# Patient Record
Sex: FEMALE | Race: OTHER | HISPANIC OR LATINO | URBAN - METROPOLITAN AREA
[De-identification: names, ages, dates, MRNs, and addresses within clinical notes are randomized per-mention and may not be internally consistent; named-entity substitution may affect disease eponyms.]

---

## 2022-05-03 ENCOUNTER — ANNUAL EXAM (OUTPATIENT)
Dept: OBGYN CLINIC | Facility: MEDICAL CENTER | Age: 32
End: 2022-05-03
Payer: COMMERCIAL

## 2022-05-03 VITALS — SYSTOLIC BLOOD PRESSURE: 108 MMHG | WEIGHT: 150.9 LBS | DIASTOLIC BLOOD PRESSURE: 68 MMHG

## 2022-05-03 DIAGNOSIS — N89.8 VAGINAL ODOR: ICD-10-CM

## 2022-05-03 DIAGNOSIS — T83.32XA INTRAUTERINE CONTRACEPTIVE DEVICE THREADS LOST, INITIAL ENCOUNTER: ICD-10-CM

## 2022-05-03 DIAGNOSIS — Z01.419 ENCOUNTER FOR ROUTINE GYNECOLOGICAL EXAMINATION WITH PAPANICOLAOU SMEAR OF CERVIX: Primary | ICD-10-CM

## 2022-05-03 PROCEDURE — G0145 SCR C/V CYTO,THINLAYER,RESCR: HCPCS | Performed by: OBSTETRICS & GYNECOLOGY

## 2022-05-03 PROCEDURE — 99385 PREV VISIT NEW AGE 18-39: CPT | Performed by: OBSTETRICS & GYNECOLOGY

## 2022-05-03 PROCEDURE — 0503F POSTPARTUM CARE VISIT: CPT | Performed by: OBSTETRICS & GYNECOLOGY

## 2022-05-03 PROCEDURE — G0476 HPV COMBO ASSAY CA SCREEN: HCPCS | Performed by: OBSTETRICS & GYNECOLOGY

## 2022-05-03 NOTE — PATIENT INSTRUCTIONS
Frotis de Papanicolaou   LO QUE NECESITA SABER:   Un frotis o de Papanicolaou o eduardo citología se [place] en la detección del cáncer cervical  También se [place] para detectar células cancerosas y precancerosas en la vulva y en la vagina  INSTRUCCIONES SOBRE EL LEVI HOSPITALARIA:   Prepárese para un Papanicolaou: El mejor momento para programar el examen es kierra después de que termine harrington menstruación  No programe eduardo citología jenni harrington periodo menstrual   Jenni la prueba de Papanicolaou:  · Usted se acostará [place] y colocará [place] pies en unos reposapiés que se conocen maria fernanda estribos  Harrington médico introducirá cuidadosamente en harrington vagina un dispositivo que se conoce maria fernanda espéculo  El espéculo se [place] para abrir las sofia de la vagina para que el médico pueda observar harrington margaret uterino  · Harrington médico tomará muestras de células de harrington margaret uterino y vagina con cuidado  Las muestras se colocan en un tubo con líquido o en un portaobjetos de velia  Las muestras se mandan al laboratorio para ser analizadas por la presencia de células anormales  Podrían realizarle eduardo prueba para detectar el virus del papiloma humano (VPH) al [employer]  El VPH es un virus de transmisión sexual que puede provocar cambios en whit[place] células cervicales  Después de la prueba de Papanicolaou: Es posible que presente manchado el día del procedimiento  Resultados de la prueba: Harrington médico le indicará para cuándo puede esperar [place] resultados de la prueba del Papanicolaou  Suelen tardar alrededor de 1 a 3 semanas  · Los resultados normales significan que no se encontraron cambios celulares en el margaret del [place]  Es posible que pueda esperar de 3 a 5 años para harrington próxima prueba de Papanicolaou  · Los [employer] shaina pueden significar que no obtuvieron [place] de tejido del margaret del [place]  O gardenia, puede significar que hay cambios celulares que parecen anormales   [place] podría ser debido a eduardo infección, a un embarazo, a la menopausia o al VPH  Es posible que necesite otra prueba de Papanicolaou en 1 año  Harrington médico le indicará qué hacer a continuación  · Los resultados anormales significan que se encontraron cambios celulares en el margaret del [place]  [place] no significa que tenga cáncer de margaret uterino  Podría significar que tiene inflamación o eduardo infección  También podría significar que tiene VPH o células que podrían convertirse en cáncer  Harrington médico le explicará el resultado anormal de la prueba y repasará los siguientes pasos con usted  Es posible que le recomiende eduardo colposcopia  González cory procedimiento, se utiliza un pequeño endoscopio con Tanner Maser rani para observar más de cerca el margaret uterino y la vagina  Frecuencia de realización del Papanicolaou: Las pruebas de Papanicolaou generalmente comienzan a los 24 años y [address]  Se puede hacer eduardo prueba de Papanicolaou yolie cada 3 años  La prueba del VPH yolie o con la prueba de Papanicolaou se puede hacer cada 5 años, comenzando a los 27 años de Samuel  Usted podría necesitar un frotis de Papanicolaou con mayor frecuencia después de los 72 años si presenta cualquiera de los siguientes:  · Resultado anormal de la prueba de Papanicolaou    · Resultado positivo en la prueba de VPH    · Antecedentes de cáncer de margaret uterino o un alto riesgo de cáncer de margaret uterino    · El VIH    · Un sistema inmunitario débil    · Exposición al [facility]  [employer] de dietilestilbestrol (PAT) cuando harrington madre estaba embarazada de usted    Llame a harrington médico si:  · Tiene eduardo hemorragia grave  · Trujillo pasado 3 semanas y no tiene los resultados de la prueba  · Usted tiene preguntas o inquietudes acerca de harrington condición o cuidado  © Copyright [employer] 2022 Information is for End User's use only and may not be sold, redistributed or otherwise used for commercial purposes   All illustrations and images included in CareNotes® are the copyrighted property of [employer]  or [address]  Esta información es sólo para uso en educación  Harrington intención no es darle un consejo médico sobre enfermedades o tratamientos  Colsulte con harrington Lucia Kash farmacéutico antes de seguir cualquier régimen médico para saber si es seguro y efectivo para usted

## 2022-05-03 NOTE — PROGRESS NOTES
ASSESSMENT & PLAN: Tomy Soriano is a 34 y o  No obstetric history on file  with normal gynecologic exam     1   Routine well woman exam done today  2  Pap and HPV:  The patient's last pap and hpv was unknown  It was normal     Pap and cotesting was done today  Current ASCCP Guidelines reviewed  3   The following were reviewed in today's visit: breast self exam, family planning choices, exercise and healthy diet  4  paragard since 5 years ago - strings not seen on exam today / US ordered     CC:  Annual Gynecologic Examination    HPI: Tomy Soriano is a 34 y o  No obstetric history on file  who presents for annual gynecologic examination  She has the following concerns:  contemplating pregnancy in the future / 2 prior C/S / paragard for birth control      Health Maintenance:    She wears her seatbelt routinely  She does perform regular monthly self breast exams  She feels safe at home  No past medical history on file  No past surgical history on file  Past OB/Gyn History:  OB History    No obstetric history on file  Pt does not have menstrual issues  History of sexually transmitted infection: No   History of abnormal pap smears: No      Patient is currently sexually active  heterosexual   The current method of family planning is IUD  No family history on file      Social History:  Social History     Socioeconomic History    Marital status: /Civil Union     Spouse name: Not on file    Number of children: Not on file    Years of education: Not on file    Highest education level: Not on file   Occupational History    Not on file   Tobacco Use    Smoking status: Never Smoker    Smokeless tobacco: Never Used   Substance and Sexual Activity    Alcohol use: Never    Drug use: Never    Sexual activity: Yes   Other Topics Concern    Not on file   Social History Narrative    Not on file     Social Determinants of Health     Financial Resource Strain: Not on file Food Insecurity: Not on file   Transportation Needs: Not on file   Physical Activity: Not on file   Stress: Not on file   Social Connections: Not on file   Intimate Partner Violence: Not on file   Housing Stability: Not on file         Allergies   Allergen Reactions    Aspirin Swelling and Rash       No current outpatient medications on file  Review of Systems  Constitutional :no fever, feels well, no tiredness, no recent weight gain or loss  ENT: no ear ache, no loss of hearing, no nosebleeds or nasal discharge, no sore throat or hoarseness  Cardiovascular: no complaints of slow or fast heart beat, no chest pain, no palpitations, no leg claudication or lower extremity edema  Respiratory: no complaints of shortness of shortness of breath, no SHELLEY  Breasts:no complaints of breast pain, breast lump, or nipple discharge  Gastrointestinal: no complaints of abdominal pain, constipation, nausea, vomiting, or diarrhea or bloody stools  Genitourinary : no complaints of dysuria, incontinence, pelvic pain, no dysmenorrhea, vaginal discharge or abnormal vaginal bleeding and as noted in HPI  Musculoskeletal: no complaints of arthralgia, no myalgia, no joint swelling or stiffness, no limb pain or swelling  Integumentary: no complaints of skin rash or lesion, itching or dry skin  Neurological: no complaints of headache, no confusion, no numbness or tingling, no dizziness or fainting    Objective      /68   Wt 68 4 kg (150 lb 14 4 oz)   LMP 04/09/2022   General:   appears stated age, cooperative, alert normal mood and affect   Lungs: Unlabored breathing    Breasts: normal appearance, no masses or tenderness   Abdomen: soft, non-tender, without masses or organomegaly   Vulva: normal   Vagina: normal vagina, no discharge, exudate, lesion, or erythema   Urethra: normal   Cervix: Normal, no discharge   IUS strings not seen    Uterus: normal size, contour, position, consistency, mobility, non-tender   Adnexa: no mass, fullness, tenderness   Psychiatric orientation to person, place, and time: normal  mood and affect: normal

## 2022-05-04 LAB
HPV HR 12 DNA CVX QL NAA+PROBE: NEGATIVE
HPV16 DNA CVX QL NAA+PROBE: NEGATIVE
HPV18 DNA CVX QL NAA+PROBE: NEGATIVE

## 2022-05-10 LAB
LAB AP GYN PRIMARY INTERPRETATION: NORMAL
Lab: NORMAL

## 2022-05-11 RX ORDER — METRONIDAZOLE 500 MG/1
500 TABLET ORAL EVERY 12 HOURS SCHEDULED
Qty: 14 TABLET | Refills: 0 | Status: SHIPPED | OUTPATIENT
Start: 2022-05-11 | End: 2022-05-18

## 2022-05-28 ENCOUNTER — HOSPITAL ENCOUNTER (OUTPATIENT)
Dept: ULTRASOUND IMAGING | Facility: HOSPITAL | Age: 32
Discharge: HOME | End: 2022-05-28
Payer: COMMERCIAL

## 2022-05-28 DIAGNOSIS — T83.32XA INTRAUTERINE CONTRACEPTIVE DEVICE THREADS LOST, INITIAL ENCOUNTER: ICD-10-CM

## 2022-05-28 PROCEDURE — 76830 TRANSVAGINAL US NON-OB: CPT

## 2022-05-28 PROCEDURE — 76856 US EXAM PELVIC COMPLETE: CPT

## 2023-08-03 ENCOUNTER — APPOINTMENT (OUTPATIENT)
Dept: LAB | Facility: CLINIC | Age: 33
End: 2023-08-03
Payer: COMMERCIAL

## 2023-08-03 ENCOUNTER — TELEPHONE (OUTPATIENT)
Dept: OBGYN CLINIC | Facility: MEDICAL CENTER | Age: 33
End: 2023-08-03

## 2023-08-03 DIAGNOSIS — R39.9 UTI SYMPTOMS: Primary | ICD-10-CM

## 2023-08-03 DIAGNOSIS — R39.9 UTI SYMPTOMS: ICD-10-CM

## 2023-08-03 LAB
BACTERIA UR QL AUTO: ABNORMAL /HPF
BILIRUB UR QL STRIP: NEGATIVE
CLARITY UR: CLEAR
COLOR UR: ABNORMAL
GLUCOSE UR STRIP-MCNC: NEGATIVE MG/DL
HGB UR QL STRIP.AUTO: ABNORMAL
KETONES UR STRIP-MCNC: NEGATIVE MG/DL
LEUKOCYTE ESTERASE UR QL STRIP: ABNORMAL
NITRITE UR QL STRIP: NEGATIVE
NON-SQ EPI CELLS URNS QL MICRO: ABNORMAL /HPF
PH UR STRIP.AUTO: 6.5 [PH]
PROT UR STRIP-MCNC: NEGATIVE MG/DL
RBC #/AREA URNS AUTO: ABNORMAL /HPF
SP GR UR STRIP.AUTO: 1.01 (ref 1–1.03)
UROBILINOGEN UR STRIP-ACNC: <2 MG/DL
WBC #/AREA URNS AUTO: ABNORMAL /HPF

## 2023-08-03 PROCEDURE — 87086 URINE CULTURE/COLONY COUNT: CPT

## 2023-08-03 PROCEDURE — 81001 URINALYSIS AUTO W/SCOPE: CPT

## 2023-08-04 ENCOUNTER — OFFICE VISIT (OUTPATIENT)
Dept: OBGYN CLINIC | Facility: MEDICAL CENTER | Age: 33
End: 2023-08-04
Payer: COMMERCIAL

## 2023-08-04 VITALS — WEIGHT: 148.4 LBS

## 2023-08-04 DIAGNOSIS — N30.00 ACUTE CYSTITIS WITHOUT HEMATURIA: Primary | ICD-10-CM

## 2023-08-04 PROCEDURE — 99213 OFFICE O/P EST LOW 20 MIN: CPT | Performed by: OBSTETRICS & GYNECOLOGY

## 2023-08-04 RX ORDER — FLUCONAZOLE 150 MG/1
150 TABLET ORAL ONCE
Qty: 1 TABLET | Refills: 0 | Status: SHIPPED | OUTPATIENT
Start: 2023-08-04 | End: 2023-08-04 | Stop reason: SDUPTHER

## 2023-08-04 RX ORDER — PHENAZOPYRIDINE HYDROCHLORIDE 100 MG/1
100 TABLET, FILM COATED ORAL 3 TIMES DAILY PRN
Qty: 6 TABLET | Refills: 0 | Status: SHIPPED | OUTPATIENT
Start: 2023-08-04 | End: 2023-08-04 | Stop reason: SDUPTHER

## 2023-08-04 RX ORDER — NITROFURANTOIN 25; 75 MG/1; MG/1
100 CAPSULE ORAL 2 TIMES DAILY
Qty: 14 CAPSULE | Refills: 0 | Status: SHIPPED | OUTPATIENT
Start: 2023-08-04 | End: 2023-08-11

## 2023-08-04 RX ORDER — PHENAZOPYRIDINE HYDROCHLORIDE 100 MG/1
100 TABLET, FILM COATED ORAL 3 TIMES DAILY PRN
Qty: 6 TABLET | Refills: 0 | Status: SHIPPED | OUTPATIENT
Start: 2023-08-04 | End: 2023-08-06

## 2023-08-04 RX ORDER — FLUCONAZOLE 150 MG/1
150 TABLET ORAL ONCE
Qty: 1 TABLET | Refills: 0 | Status: SHIPPED | OUTPATIENT
Start: 2023-08-04 | End: 2023-08-04

## 2023-08-04 RX ORDER — NITROFURANTOIN 25; 75 MG/1; MG/1
100 CAPSULE ORAL 2 TIMES DAILY
Qty: 14 CAPSULE | Refills: 0 | Status: SHIPPED | OUTPATIENT
Start: 2023-08-04 | End: 2023-08-04 | Stop reason: SDUPTHER

## 2023-08-04 NOTE — PROGRESS NOTES
Maryam/ Randall was seen today for urinary tract infection. Diagnoses and all orders for this visit:    Acute cystitis without hematuria  -   -     fluconazole (DIFLUCAN) 150 mg tablet; Take 1 tablet (150 mg total) by mouth once for 1 dose  -     nitrofurantoin (MACROBID) 100 mg capsule; Take 1 capsule (100 mg total) by mouth 2 (two) times a day for 7 days  -     phenazopyridine (PYRIDIUM) 100 mg tablet; Take 1 tablet (100 mg total) by mouth 3 (three) times a day as needed for bladder spasms for up to 2 days        Subjective   Princess Crow is a 28 y.o. female here for a problem visit. Patient is complaining of 2 days of painful urination and urinary incontinence as well as suprapubic pain . denies fevers or chills  . There is no problem list on file for this patient. Gynecologic History  No LMP recorded. The current method of family planning is IUD. No past medical history on file. Past Surgical History:   Procedure Laterality Date   •  SECTION      x 2      No family history on file.   Social History     Socioeconomic History   • Marital status: /Civil Union     Spouse name: Not on file   • Number of children: Not on file   • Years of education: Not on file   • Highest education level: Not on file   Occupational History   • Not on file   Tobacco Use   • Smoking status: Never   • Smokeless tobacco: Never   Substance and Sexual Activity   • Alcohol use: Never   • Drug use: Never   • Sexual activity: Yes   Other Topics Concern   • Not on file   Social History Narrative   • Not on file     Social Determinants of Health     Financial Resource Strain: Not on file   Food Insecurity: Not on file   Transportation Needs: Not on file   Physical Activity: Not on file   Stress: Not on file   Social Connections: Not on file   Intimate Partner Violence: Not on file   Housing Stability: Not on file     Allergies   Allergen Reactions   • Cat Hair Extract Other (See Comments)   • Wheat Bran - Food Allergy Other (See Comments)   • Aspirin Swelling and Rash       Current Outpatient Medications:   •  fluconazole (DIFLUCAN) 150 mg tablet, Take 1 tablet (150 mg total) by mouth once for 1 dose, Disp: 1 tablet, Rfl: 0  •  nitrofurantoin (MACROBID) 100 mg capsule, Take 1 capsule (100 mg total) by mouth 2 (two) times a day for 7 days, Disp: 14 capsule, Rfl: 0  •  phenazopyridine (PYRIDIUM) 100 mg tablet, Take 1 tablet (100 mg total) by mouth 3 (three) times a day as needed for bladder spasms for up to 2 days, Disp: 6 tablet, Rfl: 0    Review of Systems  Constitutional :no fever, feels well, no tiredness, no recent weight gain or loss  ENT: no ear ache, no loss of hearing, no nosebleeds or nasal discharge, no sore throat or hoarseness. Cardiovascular: no complaints of slow or fast heart beat, no chest pain, no palpitations, no leg claudication or lower extremity edema. Respiratory: no complaints of shortness of shortness of breath, no SHELLEY  Breasts:no complaints of breast pain, breast lump, or nipple discharge  Gastrointestinal: no complaints of abdominal pain, constipation, nausea, vomiting, or diarrhea or bloody stools  Genitourinary : d as noted in HPI. Musculoskeletal: no complaints of arthralgia, no myalgia, no joint swelling or stiffness, no limb pain or swelling. Integumentary: no complaints of skin rash or lesion, itching or dry skin  Neurological: no complaints of headache, no confusion, no numbness or tingling, no dizziness or fainting     Objective     Wt 67.3 kg (148 lb 6.4 oz)     General:   appears stated age, cooperative, alert normal mood and affect   Lungs: Unlabored breathing     Abdomen: soft, non-tender, without masses or organomegaly   Vulva: normal   Vagina: normal vagina, no discharge, exudate, lesion, or erythema   Urethra: normal   Cervix: Normal, no discharge. Nontender.    Uterus: normal size, contour, position, consistency, mobility, non-tender   Adnexa: no mass, fullness, tenderness Psychiatric orientation to person, place, and time: normal. mood and affect: normal

## 2023-08-05 LAB — BACTERIA UR CULT: NORMAL
